# Patient Record
Sex: MALE | NOT HISPANIC OR LATINO | Employment: FULL TIME | URBAN - METROPOLITAN AREA
[De-identification: names, ages, dates, MRNs, and addresses within clinical notes are randomized per-mention and may not be internally consistent; named-entity substitution may affect disease eponyms.]

---

## 2023-05-03 ENCOUNTER — OFFICE VISIT (OUTPATIENT)
Dept: PRIMARY CARE | Facility: CLINIC | Age: 38
End: 2023-05-03
Payer: COMMERCIAL

## 2023-05-03 VITALS
SYSTOLIC BLOOD PRESSURE: 118 MMHG | BODY MASS INDEX: 26.49 KG/M2 | OXYGEN SATURATION: 98 % | DIASTOLIC BLOOD PRESSURE: 80 MMHG | WEIGHT: 159 LBS | HEART RATE: 79 BPM | HEIGHT: 65 IN

## 2023-05-03 DIAGNOSIS — Z00.00 HEALTH MAINTENANCE EXAMINATION: Primary | ICD-10-CM

## 2023-05-03 DIAGNOSIS — F41.9 ANXIETY: ICD-10-CM

## 2023-05-03 LAB
ALANINE AMINOTRANSFERASE (SGPT) (U/L) IN SER/PLAS: 15 U/L (ref 10–52)
ALBUMIN (G/DL) IN SER/PLAS: 4.6 G/DL (ref 3.4–5)
ALKALINE PHOSPHATASE (U/L) IN SER/PLAS: 68 U/L (ref 33–120)
ANION GAP IN SER/PLAS: 12 MMOL/L (ref 10–20)
ASPARTATE AMINOTRANSFERASE (SGOT) (U/L) IN SER/PLAS: 15 U/L (ref 9–39)
BILIRUBIN TOTAL (MG/DL) IN SER/PLAS: 0.4 MG/DL (ref 0–1.2)
CALCIDIOL (25 OH VITAMIN D3) (NG/ML) IN SER/PLAS: 26 NG/ML
CALCIUM (MG/DL) IN SER/PLAS: 10.1 MG/DL (ref 8.6–10.6)
CARBON DIOXIDE, TOTAL (MMOL/L) IN SER/PLAS: 30 MMOL/L (ref 21–32)
CHLORIDE (MMOL/L) IN SER/PLAS: 102 MMOL/L (ref 98–107)
CHOLESTEROL (MG/DL) IN SER/PLAS: 183 MG/DL (ref 0–199)
CHOLESTEROL IN HDL (MG/DL) IN SER/PLAS: 38.5 MG/DL
CHOLESTEROL/HDL RATIO: 4.8
CREATININE (MG/DL) IN SER/PLAS: 1.29 MG/DL (ref 0.5–1.3)
ERYTHROCYTE DISTRIBUTION WIDTH (RATIO) BY AUTOMATED COUNT: 12.5 % (ref 11.5–14.5)
ERYTHROCYTE MEAN CORPUSCULAR HEMOGLOBIN CONCENTRATION (G/DL) BY AUTOMATED: 32.7 G/DL (ref 32–36)
ERYTHROCYTE MEAN CORPUSCULAR VOLUME (FL) BY AUTOMATED COUNT: 91 FL (ref 80–100)
ERYTHROCYTES (10*6/UL) IN BLOOD BY AUTOMATED COUNT: 4.95 X10E12/L (ref 4.5–5.9)
GFR MALE: 73 ML/MIN/1.73M2
GLUCOSE (MG/DL) IN SER/PLAS: 98 MG/DL (ref 74–99)
HEMATOCRIT (%) IN BLOOD BY AUTOMATED COUNT: 44.9 % (ref 41–52)
HEMOGLOBIN (G/DL) IN BLOOD: 14.7 G/DL (ref 13.5–17.5)
LEUKOCYTES (10*3/UL) IN BLOOD BY AUTOMATED COUNT: 7.6 X10E9/L (ref 4.4–11.3)
NON-HDL CHOLESTEROL: 145 MG/DL
NRBC (PER 100 WBCS) BY AUTOMATED COUNT: 0 /100 WBC (ref 0–0)
PLATELETS (10*3/UL) IN BLOOD AUTOMATED COUNT: 330 X10E9/L (ref 150–450)
POTASSIUM (MMOL/L) IN SER/PLAS: 4.3 MMOL/L (ref 3.5–5.3)
PROTEIN TOTAL: 7.2 G/DL (ref 6.4–8.2)
SODIUM (MMOL/L) IN SER/PLAS: 140 MMOL/L (ref 136–145)
THYROTROPIN (MIU/L) IN SER/PLAS BY DETECTION LIMIT <= 0.05 MIU/L: 1.87 MIU/L (ref 0.44–3.98)
UREA NITROGEN (MG/DL) IN SER/PLAS: 23 MG/DL (ref 6–23)

## 2023-05-03 PROCEDURE — 80053 COMPREHEN METABOLIC PANEL: CPT

## 2023-05-03 PROCEDURE — 82306 VITAMIN D 25 HYDROXY: CPT

## 2023-05-03 PROCEDURE — 82465 ASSAY BLD/SERUM CHOLESTEROL: CPT

## 2023-05-03 PROCEDURE — 99385 PREV VISIT NEW AGE 18-39: CPT | Performed by: STUDENT IN AN ORGANIZED HEALTH CARE EDUCATION/TRAINING PROGRAM

## 2023-05-03 PROCEDURE — 83718 ASSAY OF LIPOPROTEIN: CPT

## 2023-05-03 PROCEDURE — 85027 COMPLETE CBC AUTOMATED: CPT

## 2023-05-03 PROCEDURE — 84443 ASSAY THYROID STIM HORMONE: CPT

## 2023-05-03 NOTE — PROGRESS NOTES
"Daniel Washington is a 37 y.o. male seen in Clinic at /[facility] by Dr. Amrit Sandoval on 23 for routine care, as well as for management of the following chronic medical conditions: ADHD (in childhood), some anxiety concerns.    #ADHD (no treatment since childhood), Anxiety Concerns  - patient interested in counseling  - referral made today   - defer medication at this time    Past Medical History:   No past medical history on file.  Subspecialty Medical Care: none   Identified Adult Providers: Dr. Amrit Sandoval    Past Surgical History: none  Past Surgical History:   Procedure Laterality Date    OTHER SURGICAL HISTORY  2019    No history of surgery     Medications: none currently   No current outpatient medications on file.  Pharmacy: [facility]     Allergies: NKDA   Not on File  Reactions: NKDA     Immunizations: last Tdap unknown, COVID vaccine refused, defers Flu shot     Family History:   - parents health  - children healthy   - grandparents  of unknown cancers   No family history on file.    Social History:   Home/Living Situation: 5 children at home; wife; from [place], [place]   Education:   Employment/Work/Vocational:  for construction company   Activities: runner (runs to and from work)  Drug Use: non-smoker, social alcohol use   Diet: some dietary concerns, eats almost exclusively at home; no food allergies  Sexuality/Contraception/Menstrual History:    Suicide/Depression/Anxiety: history of ADHD   Sleep: no sleep concerns    Transition Processes:  Financial/Health Insurance: has insurance   Legal/Guardian: Wife, [phone]  Contact Information: [phone]    Visit Vitals  /80   Pulse 79   Ht 1.651 m (5' 5\")   Wt 72.1 kg (159 lb)   SpO2 98%   BMI 26.46 kg/m²   BSA 1.82 m²      PHYSICAL EXAM:   General: well appearing,  male, pleasant and engaged in encounter    HEENT: NCAT, MMM  CV: RRR, no m/r/g  PULM: CTAB, non-labored respirations   ABD: soft, NT, " ND  : no suprapubic tenderness   EXT: WWP, no significant edema   SKIN: no rashes noted   NEURO: A&Ox4, symmetric facies, no gross motor or sensory deficits, normal gait  PSYCH: pleasant mood, appropriate affect     Assessment/Plan    Daniel Washington is a 37 y.o. male seen in Clinic at /[facility] by Dr. Amrit Sandoval on 05/03/23 for routine care, as well as for management of the following chronic medical conditions: ADHD (in childhood), some anxiety concerns.    #ADHD (no treatment since childhood), Anxiety Concerns  - patient interested in counseling  - referral made today   - defer medication at this time    #Health Maintenance   - Vision, Hearing screens: corrective lenses   - Counseling regarding alcohol/tobacco/drug use: social alcohol use, non-smoker   - Depression screen: some anxiety concerns, psychology referral as above   - BMI, Lipid, A1C screening and nutritional/exercise counseling: labs today   - Blood Pressure: WNL  - Safe Sexual Practices, STI, HIV screening: , monogamous, defers   - Immunizations: defers any vaccines, COVID non-vaccinated, no Flu shot, unknown last Tdap     Return to clinic annually for follow-up, sooner if acute issues arise and/or pending lab results.     Amrit Sandoval MD  Internal Medicine-Pediatrics   [facility] [place] [address], [address]  P: [phone], F: [phone]